# Patient Record
Sex: MALE | Race: WHITE
[De-identification: names, ages, dates, MRNs, and addresses within clinical notes are randomized per-mention and may not be internally consistent; named-entity substitution may affect disease eponyms.]

---

## 2020-02-29 ENCOUNTER — HOSPITAL ENCOUNTER (EMERGENCY)
Dept: HOSPITAL 56 - MW.ED | Age: 25
Discharge: HOME | End: 2020-02-29
Payer: COMMERCIAL

## 2020-02-29 DIAGNOSIS — F17.210: ICD-10-CM

## 2020-02-29 DIAGNOSIS — S92.001A: Primary | ICD-10-CM
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
## 2020-02-29 NOTE — EDM.PDOC
ED HPI GENERAL MEDICAL PROBLEM





- General


Chief Complaint: Lower Extremity Injury/Pain


Stated Complaint: INJURED R HEEL AT HOME


Time Seen by Provider: 02/29/20 10:46


Source of Information: Reports: Patient


History Limitations: Reports: No Limitations





- History of Present Illness


INITIAL COMMENTS - FREE TEXT/NARRATIVE: 


HISTORY AND PHYSICAL:





History of present illness:


Patient is a 24-year-old male who presents to the emergency room today with 

complaints of right heel pain.  He states last evening he kicked a door and 

since that time has had increased pain with weightbearing and palpation.  He 

denies any previous injury or trauma of the affected extremity.  He denies any 

numbness, tingling or saddle paresthesia.  Offers no systemic complaints.





Review of systems: 


As per history of present illness and below otherwise all systems reviewed and 

negative.





Past medical history: 


As per history of present illness and as reviewed below otherwise 

noncontributory.





Surgical history: 


As per history of present illness and as reviewed below otherwise 

noncontributory.





Social history: 


See social history for further information





Family history: 


As per history of present illness and as reviewed below otherwise 

noncontributory.





Physical exam:


General: Developed and well-nourished 24-year-old male.  Alert and oriented.  

Nontoxic-appearing and in no acute distress.


HEENT: Atraumatic, normocephalic, pupils equal and reactive bilaterally, 

negative for conjunctival pallor or scleral icterus, mucous membranes moist, 

trachea midline. No drooling or trismus noted. No meningeal signs. No hot 

potato voice noted. 


Lungs: Clear to auscultation, breath sounds equal bilaterally, chest nontender.


Heart: S1S2, regular rate and rhythm without overt murmur


Abdomen: Soft, nondistended, nontender.


Skin: Mild soft tissue swelling and bruising on the right medial heel.  

Otherwise skin is intact, warm, dry. No lesions or rashes noted.


Extremities: Ambulatory, pain with palpation to the right medial and solar 

surface of the calcaneus, moves all extremities per self without difficulty or 

deficits, negative for cords or calf pain. Cap refill less than 3 seconds. 

Strong pedal pulses. Neurovascular unremarkable.


Neuro: Awake, alert, oriented. Cranial nerves II through XII unremarkable. 

Cerebellum unremarkable. Motor and sensory unremarkable throughout. Exam 

nonfocal.





Notes:


X-ray shows small lucent line identified off the plantar aspect of the 

posterior calcaneus.  Radiology is unable to exclude a nondisplaced fracture.  

Patient does have pain at this site with soft tissue swelling and bruising.  

This information was shared with the patient.  Will treat as a calcaneus 

fracture.  We will put him in a cam walker boot and provide crutches so he can 

be non-weightbearing until he follows up with podiatry. Supportive care 

measures were reviewed and discussed. Voices understanding and is agreeable to 

plan of care. Denies any further questions or concerns at this time.





Diagnostics:


Calcaneal x-ray





Therapeutics:


CAM walker boot and crutches





Prescription:


Tramadol





Impression: 


R/o calcaneus fracture, right





Plan:


1. Rest, ice, elevate the affected extremity. Please wear the CAM walker boot 

and use crutches until you have been cleared by Podiatry.


2. Tylenol and/or Ibuprofen as needed for pain management. Tramadol for 

moderate to severe pain.  This medication may cause drowsiness so do not take 

it while driving or needing to be functioning outside of the house.


3. Follow up with the Podiatry as we discussed. Return to the ED as needed and 

as discussed.





Definitive disposition and diagnosis as appropriate pending reevaluation and 

review of above.





  ** right heel


Pain Score (Numeric/FACES): 2





- Related Data


 Allergies











Allergy/AdvReac Type Severity Reaction Status Date / Time


 


No Known Allergies Allergy   Verified 02/29/20 10:47











Home Meds: 


 Home Meds





. [No Known Home Meds]  02/29/20 [History]











Past Medical History





- Past Health History


Medical/Surgical History: Denies Medical/Surgical History





Social & Family History





- Family History


Family Medical History: Noncontributory





- Tobacco Use


Smoking Status *Q: Current Every Day Smoker


Years of Tobacco use: 10


Packs/Tins Daily: 0.5





- Recreational Drug Use


Recreational Drug Use: No





Review of Systems





- Review of Systems


Review Of Systems: Comprehensive ROS is negative, except as noted in HPI.





ED EXAM, GENERAL





- Physical Exam


Exam: See Below (See dictation)





Course





- Vital Signs


Last Recorded V/S: 





 Last Vital Signs











Temp  97.7 F   02/29/20 10:45


 


Pulse  82   02/29/20 10:45


 


Resp  16   02/29/20 10:45


 


BP  124/85   02/29/20 10:45


 


Pulse Ox  99   02/29/20 10:45














- Orders/Labs/Meds


Orders: 





 Active Orders 24 hr











 Category Date Time Status


 


 DME for Discharge [COMM] Stat Oth  02/29/20 11:52 Ordered














Departure





- Departure


Time of Disposition: 12:00


Disposition: Home, Self-Care 01


Clinical Impression: 


Calcaneal fracture


Qualifiers:


 Encounter type: initial encounter Calcaneus location: unspecified portion of 

calcaneus Fracture type: closed Fracture alignment: nondisplaced Laterality: 

right Qualified Code(s): S92.001A - Unspecified fracture of right calcaneus, 

initial encounter for closed fracture








- Discharge Information


Referrals: 


PCP,None [Primary Care Provider] - 


Additional Instructions: 


The following information is given to patients seen in the emergency department 

who are being discharged to home. This information is to outline your options 

for follow-up care. We provide all patients seen in our emergency department 

with a follow-up referral.





The need for follow-up, as well as the timing and circumstances, are variable 

depending upon the specifics of your emergency department visit.





If you don't have a primary care physician on staff, we will provide you with a 

referral. We always advise you to contact your personal physician following an 

emergency department visit to inform them of the circumstance of the visit and 

for follow-up with them and/or the need for any referrals to a consulting 

specialist.





The emergency department will also refer you to a specialist when appropriate. 

This referral assures that you have the opportunity for follow-up care with a 

specialist. All of these measure are taken in an effort to provide you with 

optimal care, which includes your follow-up.





Under all circumstances we always encourage you to contact your private 

physician who remains a resource for coordinating your care. When calling for 

follow-up care, please make the office aware that this follow-up is from your 

recent emergency room visit. If for any reason you are refused follow-up, 

please contact the [facility] Emergency 

Department at [phone] and asked to speak to the emergency department 

charge nurse.





[facility]


Primary Care


[address] [place] [id number]


Phone: [phone]


Fax: [phone]





[address] [address]


Phone: [phone]


Fax: [phone]





1. Rest, ice, elevate the affected extremity. Please wear the CAM walker boot 

and use crutches until you have been cleared by Podiatry.


2. Tylenol and/or Ibuprofen as needed for pain management. Tramadol for 

moderate to severe pain.  This medication may cause drowsiness so do not take 

it while driving or needing to be functioning outside of the house.


3. Follow up with the Podiatry as we discussed. Return to the ED as needed and 

as discussed.





Sepsis Event Note





- Evaluation


Sepsis Screening Result: No Definite Risk





- Focused Exam


Vital Signs: 





 Vital Signs











  Temp Pulse Resp BP Pulse Ox


 


 02/29/20 10:45  97.7 F  82  16  124/85  99











Date Exam was Performed: 02/29/20


Time Exam was Performed: 11:54





- My Orders


Last 24 Hours: 





My Active Orders





02/29/20 11:52


DME for Discharge [COMM] Stat 














- Assessment/Plan


Last 24 Hours: 





My Active Orders





02/29/20 11:52


DME for Discharge [COMM] Stat

## 2020-02-29 NOTE — CR
Right calcaneus: 2 views of the right calcaneus were obtained.

 

Axial view is somewhat unconventional due to positioning.

 

Small lucent line is identified off the plantar aspect of the 

posterior calcaneus.  Uncertain if this is artifact or due to minimal 

nondisplaced fracture.  No additional bony abnormality is appreciated.

  Several sclerotic bone islands are noted within the calcaneus.

 

Impression:

1.  Lucent line as noted above.  Difficult to exclude nondisplaced 

fracture.  To rule out this possibility, calcaneal CT would be needed.

 

Diagnostic code #3

 

This report was dictated in [place] Standard Time